# Patient Record
Sex: FEMALE | URBAN - METROPOLITAN AREA
[De-identification: names, ages, dates, MRNs, and addresses within clinical notes are randomized per-mention and may not be internally consistent; named-entity substitution may affect disease eponyms.]

---

## 2020-12-14 ENCOUNTER — OFFICE VISIT (OUTPATIENT)
Dept: URBAN - METROPOLITAN AREA CLINIC 41 | Facility: CLINIC | Age: 65
End: 2020-12-14
Payer: MEDICARE

## 2020-12-14 PROCEDURE — 92014 COMPRE OPH EXAM EST PT 1/>: CPT | Performed by: OPHTHALMOLOGY

## 2020-12-14 PROCEDURE — 92134 CPTRZ OPH DX IMG PST SGM RTA: CPT | Performed by: OPHTHALMOLOGY

## 2020-12-14 ASSESSMENT — INTRAOCULAR PRESSURE
OS: 12
OD: 14

## 2020-12-14 NOTE — IMPRESSION/PLAN
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 Plan: Has some tearing in setting or RLL ectropion. May consider plastics eval.  Stable from retina perspective. Will refer.

## 2020-12-14 NOTE — IMPRESSION/PLAN
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
OCT OU = no SRF/IRF ,  ERM with IRF  PMHx: Has myasthenia gravis. No longer takes Cellcept 1000mg PO BID. Labs: RPR negative, FIOR negative, ANCAs negative, Lyme negative, HSV/VZV serology +, quant indeterminant Plan: CMV retinitis OS. (Also had some retinitis OD) Last rituxan end of January 2019. No longer on cellcept. Now on soliris. No longer taking valganciclovir. Stable ERM/CME. Mild cell today. Was worse on daily durezol, stable on BID. Rec increase to TID x 2 weeks, then taper back to BID. May consider PPV/MP in future with symptomatic change. Call with changes/symptoms 3m OCT OU re-eval STS OS

## 2021-02-08 ENCOUNTER — OFFICE VISIT (OUTPATIENT)
Dept: URBAN - METROPOLITAN AREA CLINIC 41 | Facility: CLINIC | Age: 66
End: 2021-02-08
Payer: COMMERCIAL

## 2021-02-08 PROCEDURE — 92134 CPTRZ OPH DX IMG PST SGM RTA: CPT | Performed by: OPHTHALMOLOGY

## 2021-02-08 PROCEDURE — 99213 OFFICE O/P EST LOW 20 MIN: CPT | Performed by: OPHTHALMOLOGY

## 2021-02-08 ASSESSMENT — INTRAOCULAR PRESSURE
OD: 15
OS: 17

## 2021-02-08 NOTE — IMPRESSION/PLAN
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
OCT OU = no SRF/IRF  +3,  ERM with IRF  -12 PMHx: Has myasthenia gravis. No longer takes Cellcept 1000mg PO BID. Labs: RPR negative, FIOR negative, ANCAs negative, Lyme negative, HSV/VZV serology +, quant indeterminant Plan: CMV retinitis OS. (Also had some retinitis OD) Last rituxan end of January 2019. No longer on cellcept. Now on soliris. No longer taking valganciclovir. Stable ERM/CME. May consider PPV/MP in future with symptomatic change. Was worse on daily durezol, stable on BID. Had been on TID for mild flare, then back to BID. Now on TID again for corneal edema per Dr. Kitty Farrell. Rec CSM. Call with changes/symptoms 3m OCT OU re-eval STS OS

## 2021-02-08 NOTE — IMPRESSION/PLAN
Impression: Corneal edema: H18.20. Plan: Stable from retina perspective for cornea surgery. D/w patient that retinal/macular pathology may limit final visual outcome. Patient voices understanding.

## 2021-04-02 ENCOUNTER — OFFICE VISIT (OUTPATIENT)
Dept: URBAN - METROPOLITAN AREA CLINIC 41 | Facility: CLINIC | Age: 66
End: 2021-04-02
Payer: MEDICARE

## 2021-04-02 PROCEDURE — 92134 CPTRZ OPH DX IMG PST SGM RTA: CPT | Performed by: OPHTHALMOLOGY

## 2021-04-02 PROCEDURE — 92014 COMPRE OPH EXAM EST PT 1/>: CPT | Performed by: OPHTHALMOLOGY

## 2021-04-02 ASSESSMENT — INTRAOCULAR PRESSURE
OD: 13
OS: 12

## 2021-04-02 NOTE — IMPRESSION/PLAN
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
- h/o gold weight Plan: Has some tearing in setting or RLL ectropion.   Improved after plastics eval

## 2021-05-27 ENCOUNTER — OFFICE VISIT (OUTPATIENT)
Dept: URBAN - METROPOLITAN AREA CLINIC 41 | Facility: CLINIC | Age: 66
End: 2021-05-27
Payer: MEDICARE

## 2021-05-27 DIAGNOSIS — H30.92: Primary | ICD-10-CM

## 2021-05-27 PROCEDURE — 99213 OFFICE O/P EST LOW 20 MIN: CPT | Performed by: OPHTHALMOLOGY

## 2021-05-27 PROCEDURE — 92134 CPTRZ OPH DX IMG PST SGM RTA: CPT | Performed by: OPHTHALMOLOGY

## 2021-05-27 ASSESSMENT — INTRAOCULAR PRESSURE
OD: 15
OS: 12

## 2021-05-27 NOTE — IMPRESSION/PLAN
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
OCT OU = no SRF/IRF OD, ERM with IRF OS  / 396 PMHx: Has myasthenia gravis. No longer takes Cellcept 1000mg PO BID. Labs: RPR negative, FIOR negative, ANCAs negative, Lyme negative, HSV/VZV serology +, quant indeterminant Plan: CMV retinitis OS. (Also had some retinitis OD) Last rituxan end of January 2019. No longer on cellcept. Now on soliris. No longer taking valganciclovir. Stable ERM/CME. May consider PPV/MP in future with symptomatic change. Was worse on daily durezol, stable on BID. Had been on TID for mild flare, then back to BID. Now on TID again for corneal edema per Dr. Luisito Mathis. Rec CSM. Call with changes/symptoms 3 m OCT OU re-eval STS OS ([facility])

## 2021-09-20 ENCOUNTER — OFFICE VISIT (OUTPATIENT)
Dept: URBAN - METROPOLITAN AREA CLINIC 7 | Facility: CLINIC | Age: 66
End: 2021-09-20
Payer: MEDICARE

## 2021-09-20 DIAGNOSIS — Z96.1 PRESENCE OF INTRAOCULAR LENS: ICD-10-CM

## 2021-09-20 DIAGNOSIS — H35.372 PUCKERING OF MACULA, LEFT EYE: ICD-10-CM

## 2021-09-20 PROCEDURE — 99214 OFFICE O/P EST MOD 30 MIN: CPT | Performed by: OPHTHALMOLOGY

## 2021-09-20 PROCEDURE — 92134 CPTRZ OPH DX IMG PST SGM RTA: CPT | Performed by: OPHTHALMOLOGY

## 2021-09-20 ASSESSMENT — INTRAOCULAR PRESSURE
OD: 7
OS: 6

## 2021-09-20 NOTE — IMPRESSION/PLAN
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
OCT OU = no SRF/IRF OD, ERM with IRF OS  / 391 PMHx: Has myasthenia gravis. No longer takes Cellcept 1000mg PO BID. Labs: RPR negative, FIOR negative, ANCAs negative, Lyme negative, HSV/VZV serology +, quant indeterminant Plan: CMV retinitis OS. (Also had some retinitis OD) Last rituxan end of January 2019. No longer on cellcept. Now on soliris. No longer taking valganciclovir. Stable ERM/CME. May consider PPV/MP in future with symptomatic change. Was worse on daily durezol, stable on BID/TID. Had been on TID for mild flare, then back to BID. Was on TID again for corneal edema per Dr. Juarez Done - ok from retina perspective to resume BID. Stable for MRx of cornea surgery from retina perspective Call with changes/symptoms 3 m OCT OU re-eval STS OS

## 2021-09-20 NOTE — IMPRESSION/PLAN
Impression: Puckering of macula, left eye: H35.372. Plan: The patient has an ERM in the setting of corneal edema and retinitis, thus we will observe for now. Patient knows to call with any decrease in vision and increase in metamorphopsia.

## 2021-10-25 ENCOUNTER — OFFICE VISIT (OUTPATIENT)
Dept: URBAN - METROPOLITAN AREA CLINIC 13 | Facility: CLINIC | Age: 66
End: 2021-10-25
Payer: MEDICARE

## 2021-10-25 DIAGNOSIS — H18.20 CORNEAL EDEMA: ICD-10-CM

## 2021-10-25 PROCEDURE — 92134 CPTRZ OPH DX IMG PST SGM RTA: CPT | Performed by: OPHTHALMOLOGY

## 2021-10-25 PROCEDURE — 99213 OFFICE O/P EST LOW 20 MIN: CPT | Performed by: OPHTHALMOLOGY

## 2021-10-25 ASSESSMENT — INTRAOCULAR PRESSURE
OS: 7
OD: 16

## 2021-10-25 NOTE — IMPRESSION/PLAN
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
OCT OU = no SRF/IRF OD, ERM with IRF OS  / 1179 PMHx: Has myasthenia gravis. No longer takes Cellcept 1000mg PO BID. Labs: RPR negative, FIOR negative, ANCAs negative, Lyme negative, HSV/VZV serology +, quant indeterminant Plan: CMV retinitis OS. (Also had some retinitis OD) Last rituxan end of January 2019. No longer on cellcept. Now on soliris. No longer taking valganciclovir. Now with recurrent PVR/RRD and limited [facility] prognosis. D/w patient in detail. Please see PVR diagnosis.

## 2021-10-25 NOTE — IMPRESSION/PLAN
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 We discussed the natural history and the risks and benefit of repairing the PVR detachment, which consists of vitrectomy surgery vs observation. With repair, hopefully we can reduce the risk of further visual loss. Risks of surgery include but are not limited to loss of eye, blindness, infection, scar tissue formation, recurrent retinal tears and detachment, glaucoma, change in refractive error, ptosis, need for further surgery, epiretinal membranes, CME and use of gas or silicone oil. The patient defers surgery for now, but will consider her options. Get Optos for doc 1m Optos OU

## 2021-11-12 ENCOUNTER — OFFICE VISIT (OUTPATIENT)
Dept: URBAN - METROPOLITAN AREA CLINIC 41 | Facility: CLINIC | Age: 66
End: 2021-11-12
Payer: MEDICARE

## 2021-11-12 DIAGNOSIS — H33.42 TRACTION DETACHMENT OF RETINA, LEFT EYE: Primary | ICD-10-CM

## 2021-11-12 DIAGNOSIS — H02.109 ECTROPION OF EYE: ICD-10-CM

## 2021-11-12 PROCEDURE — 99214 OFFICE O/P EST MOD 30 MIN: CPT | Performed by: OPHTHALMOLOGY

## 2021-11-12 ASSESSMENT — INTRAOCULAR PRESSURE
OD: 11
OS: 4

## 2021-11-12 NOTE — IMPRESSION/PLAN
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 We discussed the natural history and the risks and benefit of repairing the PVR detachment, which consists of vitrectomy surgery vs observation. With repair, hopefully we can reduce the risk of further visual loss. Risks of surgery include but are not limited to loss of eye, blindness, infection, scar tissue formation, recurrent retinal tears and detachment, glaucoma, change in refractive error, ptosis, need for further surgery, epiretinal membranes, CME and use of gas or silicone oil. The patient now elects to proceed with surgery 25gPPV/MP/EL/ poss iris hooks/ poss SO vs Gas OS ([facility]) x RD with PVR/ history of ARN.

## 2021-12-01 ENCOUNTER — Encounter (OUTPATIENT)
Dept: URBAN - METROPOLITAN AREA EXTERNAL CLINIC 14 | Facility: EXTERNAL CLINIC | Age: 66
End: 2021-12-01
Payer: MEDICARE

## 2021-12-01 PROCEDURE — 67108 REPAIR DETACHED RETINA: CPT | Performed by: OPHTHALMOLOGY

## 2021-12-02 ENCOUNTER — POST-OPERATIVE VISIT (OUTPATIENT)
Dept: URBAN - METROPOLITAN AREA CLINIC 41 | Facility: CLINIC | Age: 66
End: 2021-12-02
Payer: MEDICARE

## 2021-12-02 PROCEDURE — 99024 POSTOP FOLLOW-UP VISIT: CPT | Performed by: OPHTHALMOLOGY

## 2021-12-02 NOTE — IMPRESSION/PLAN
Impression: S/P PPV/MP/EL/ poss iris hooks/ poss SO vs Gas OS - 1 Day. 12/1/2021 ([facility]) Plan: Retina flat. IOP is acceptable (4). No s/s of infection. Drops: PF QID / Ofloxacin QID Return in 1 week

## 2021-12-09 ENCOUNTER — POST-OPERATIVE VISIT (OUTPATIENT)
Dept: URBAN - METROPOLITAN AREA CLINIC 41 | Facility: CLINIC | Age: 66
End: 2021-12-09
Payer: MEDICARE

## 2021-12-09 PROCEDURE — 99024 POSTOP FOLLOW-UP VISIT: CPT | Performed by: OPHTHALMOLOGY

## 2021-12-09 ASSESSMENT — INTRAOCULAR PRESSURE
OS: 9
OD: 14

## 2021-12-09 NOTE — IMPRESSION/PLAN
Impression: S/P 25gPPV/MP/EL/ poss iris hooks/ poss SO vs Gas  ([facility]) x RD with PVR OS - 8 Days. Retinal detachment with single break, left eye  H33.012. Plan: No s/s of RD/infection VA/IOP acceptable Post-operative instructions and precautions Reviewed. Call ASAP with changes --Taper Prednisolone acetate 1% TID x 1 wk, BID x 1wk, QD x 1wk, then d/c
--Discontinue Ocuflox --Resume Durezol BID as before surgery
--hold off pre-op timolol for now 1 month POS/OCT

## 2021-12-09 NOTE — IMPRESSION/PLAN
Eating Heart-Healthy Food: Using the DASH Plan    Eating for your heart doesn’t have to be hard or boring. You just need to know how to make healthier choices. The DASH eating plan has been developed to help you do just that. DASH stands for Dietary Approaches to Stop Hypertension. It is a plan that has been proven to be healthier for your heart and to lower your risk for high blood pressure. It can also help lower your risk for cancer, heart disease, osteoporosis, and diabetes.  Choosing from each food group  Choose foods from each of the food groups below each day. Try to get the recommended number of servings for each food group. The serving numbers are based on a diet of 2,000 calories a day. Talk with your healthcare provider if you’re not sure about your calorie needs. Along with getting the correct servings, the DASH plan also advises less than 2,300 mg of salt (sodium) per day. Lowering sodium intake to 1,500 mg per day lowers blood pressure even more. (There's about 2,300 mg of sodium in 1 teaspoon of salt.)      Grains  Servings: 6 to 8 a day  A serving is:  · 1 slice bread  · 1 ounce dry cereal  · Half a cup cooked rice, pasta or cereal  Best choices: Whole grains and any grains high in fiber. Vegetables  Servings: 4 to 5 a day  A serving is:  · 1 cup raw leafy vegetable  · Half a cup cut-up raw or cooked vegetable  · Half a cup vegetable juice  Best choices: Fresh or frozen vegetables prepared without added salt or fat.   Fruits  Servings: 4 to 5 a day  A serving is:  · 1 medium fruit  · One-quarter cup dried fruit  · Half a cup fresh, frozen, or canned fruit  · Half a cup of 100% fruit juices  Best choices: A variety of fresh fruits of different colors. Whole fruits are a better choice than fruit juices. 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
OCT OU = no SRF/IRF  +3,  ERM with IRF  -12 PMHx: Has myasthenia gravis. No longer takes Cellcept 1000mg PO BID. Labs: RPR negative, FIOR negative, ANCAs negative, Lyme negative, HSV/VZV serology +, quant indeterminant Plan: CMV retinitis OS. (Also had some retinitis OD) Last rituxan end of January 2019. No longer on cellcept. Now on soliris. No longer taking valganciclovir. Stable ERM/CME. May consider PPV/MP in future with symptomatic change. Was worse on daily durezol, stable on BID. Had been on TID for mild flare, then back to BID. Now on TID again for corneal edema per Dr. Oral Rudolph. Rec CSM. Call with changes/symptoms 1 m OCT OU re-eval STS OS ([facility]) and low-fat cheeses.         Lean meats, poultry, fish  Servings: 6 or fewer a day  A serving is:  · 1 ounce cooked meats, poultry, or fish  · 1 egg  Best choices: Lean poultry and fish. Trim away visible fat. Broil, grill, roast, or boil instead of frying. Remove skin from poultry before eating. Limit how much red meat you eat.  Nuts, seeds, beans  Servings: 4 to 5 a week  A serving is:  · One-third cup nuts (one and a half ounces)  · 2 tablespoons nut butter or seeds  · Half a cup cooked dry beans or legumes  Best choices: Dry roasted nuts with no salt added, lentils, kidney beans, garbanzo beans, and whole zhu beans.   Fats and oils  Servings: 2 to 3 a day  A serving is:  · 1 teaspoon vegetable oil  · 1 teaspoon soft margarine  · 1 tablespoon mayonnaise  · 2 tablespoons salad dressing  Best choices: Nut and vegetable oils (nontropical vegetable oils), such as olive and canola oil. Sweets  Servings: 5 a week or fewer  A serving is:  · 1 tablespoon sugar, maple syrup, or honey  · 1 tablespoon jam or jelly  · 1 half-ounce jelly beans (about 15)  · 1 cup lemonade  Best choices: Dried fruit can be a satisfying sweet. Choose low-fat sweets. And watch your serving sizes!      For more on the DASH eating plan, visit:  www.nhlbi.nih.gov/health/health-topics/topics/dash   Kyree last reviewed this educational content on 7/1/2019  © 1537-4759 [employer]. [address] [place] [address], [place] [address]. All rights reserved. This information is not intended as a substitute for professional medical care. Always follow your healthcare professional's instructions.

## 2021-12-11 ENCOUNTER — POST-OPERATIVE VISIT (OUTPATIENT)
Dept: URBAN - METROPOLITAN AREA CLINIC 7 | Facility: CLINIC | Age: 66
End: 2021-12-11
Payer: MEDICARE

## 2021-12-11 PROCEDURE — 99024 POSTOP FOLLOW-UP VISIT: CPT | Performed by: OPHTHALMOLOGY

## 2021-12-11 RX ORDER — ERYTHROMYCIN 5 MG/G
OINTMENT OPHTHALMIC
Qty: 3.5 | Refills: 1 | Status: INACTIVE
Start: 2021-12-11 | End: 2021-12-12

## 2021-12-11 ASSESSMENT — INTRAOCULAR PRESSURE
OD: 13
OS: 9

## 2021-12-11 NOTE — IMPRESSION/PLAN
Impression: S/P 25gPPV/EL/SO x RD OS - 10 Days. Retinal detachment with single break, left eye  H33.012. Plan: No s/s of RD/infection VA/IOP acceptable Here as there is some heme coming from eye - likely eyelid irritation over tube shunt device
no active hemorrhaging seen, even with everted eye lids Post-operative instructions and precautions Reviewed. 
Call ASAP with changes
continue taper of drops as before
add erythromycin ointment QID

1 month POS/OCT as scheduled

## 2022-01-14 ENCOUNTER — POST-OPERATIVE VISIT (OUTPATIENT)
Dept: URBAN - METROPOLITAN AREA CLINIC 41 | Facility: CLINIC | Age: 67
End: 2022-01-14
Payer: MEDICARE

## 2022-01-14 PROCEDURE — 99024 POSTOP FOLLOW-UP VISIT: CPT | Performed by: OPHTHALMOLOGY

## 2022-01-14 ASSESSMENT — INTRAOCULAR PRESSURE
OS: 5
OD: 11

## 2022-01-14 NOTE — IMPRESSION/PLAN
Impression: S/P 25gPPV/MP/EL/ poss iris hooks/ poss SO vs Gas  ([facility]) x RD with PVR OS - 44 Days. Retinal detachment with single break, left eye  H33.012. Plan: No s/s of RD/infection VA/IOP acceptable Post-operative instructions and precautions Reviewed. Call ASAP with changes Cont Durezol BID

3m OCT OU

## 2022-04-04 ENCOUNTER — OFFICE VISIT (OUTPATIENT)
Dept: URBAN - METROPOLITAN AREA CLINIC 41 | Facility: CLINIC | Age: 67
End: 2022-04-04
Payer: MEDICARE

## 2022-04-04 DIAGNOSIS — H33.012 RETINAL DETACHMENT WITH SINGLE BREAK, LEFT EYE: Primary | ICD-10-CM

## 2022-04-04 PROCEDURE — 99214 OFFICE O/P EST MOD 30 MIN: CPT | Performed by: OPHTHALMOLOGY

## 2022-04-04 PROCEDURE — 92134 CPTRZ OPH DX IMG PST SGM RTA: CPT | Performed by: OPHTHALMOLOGY

## 2022-04-04 ASSESSMENT — INTRAOCULAR PRESSURE
OS: 13
OD: 18

## 2022-04-04 NOTE — IMPRESSION/PLAN
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 No longer takes Cellcept 1000mg PO BID. Labs: RPR negative, FIOR negative, ANCAs negative, Lyme negative, HSV/VZV serology +, quant indeterminant Plan: CMV retinitis OS. (Also had some retinitis OD) Last rituxan end of January 2019. No longer on cellcept. Now on soliris. No longer taking valganciclovir. Now with recurrent PVR/RRD and limited VA prognosis. D/w patient in detail. Please see PVR diagnosis.

## 2022-04-04 NOTE — IMPRESSION/PLAN
Impression: Retinal detachment with single break, left eye  H33.012. S/P 25gPPV/MP/EL/ poss iris hooks/ poss SO vs Gas (12/01/2021) OCT OU - no IRF/SRF OD, IRF OS  / 477  Plan: No s/s of RD/infection VA/IOP acceptable Post-operative instructions and precautions Reviewed. Call ASAP with changes Some edema - rec [id number] Durezol to BID

3m OCT OU

## 2022-05-02 ENCOUNTER — OFFICE VISIT (OUTPATIENT)
Dept: URBAN - METROPOLITAN AREA CLINIC 41 | Facility: CLINIC | Age: 67
End: 2022-05-02
Payer: MEDICARE

## 2022-05-02 DIAGNOSIS — H18.20 CORNEAL EDEMA: ICD-10-CM

## 2022-05-02 DIAGNOSIS — Z96.1 PRESENCE OF INTRAOCULAR LENS: ICD-10-CM

## 2022-05-02 DIAGNOSIS — H30.92: ICD-10-CM

## 2022-05-02 DIAGNOSIS — H02.109 ECTROPION OF EYE: ICD-10-CM

## 2022-05-02 DIAGNOSIS — H33.012 RETINAL DETACHMENT WITH SINGLE BREAK, LEFT EYE: Primary | ICD-10-CM

## 2022-05-02 PROCEDURE — 99214 OFFICE O/P EST MOD 30 MIN: CPT | Performed by: OPHTHALMOLOGY

## 2022-05-02 PROCEDURE — 92134 CPTRZ OPH DX IMG PST SGM RTA: CPT | Performed by: OPHTHALMOLOGY

## 2022-05-02 ASSESSMENT — INTRAOCULAR PRESSURE
OS: 12
OD: 17

## 2022-05-02 NOTE — IMPRESSION/PLAN
Impression: Retinal detachment with single break, left eye  H33.012. S/P 25gPPV/MP/EL/ poss iris hooks/ poss SO vs Gas (12/01/2021) OCT OU - no IRF/SRF OD, IRF OS  / 477 Plan: Patient here due to eye pain and new flashes. Corneal edema is worse and there is some mild anterior segment inflammation. VA/IOP acceptable Post-operative instructions and precautions Reviewed. Call ASAP with changes Some edema - rec increase Durezol to QID; restart Felipa; cont valtrex. 

1 mo OCT OU ([facility])

## 2022-06-27 ENCOUNTER — OFFICE VISIT (OUTPATIENT)
Dept: URBAN - METROPOLITAN AREA CLINIC 41 | Facility: CLINIC | Age: 67
End: 2022-06-27
Payer: MEDICARE

## 2022-06-27 DIAGNOSIS — H18.20 CORNEAL EDEMA: ICD-10-CM

## 2022-06-27 DIAGNOSIS — H02.109 ECTROPION OF EYE: ICD-10-CM

## 2022-06-27 DIAGNOSIS — H30.92: ICD-10-CM

## 2022-06-27 DIAGNOSIS — H33.012 RETINAL DETACHMENT WITH SINGLE BREAK, LEFT EYE: Primary | ICD-10-CM

## 2022-06-27 DIAGNOSIS — Z96.1 PRESENCE OF INTRAOCULAR LENS: ICD-10-CM

## 2022-06-27 PROCEDURE — 99213 OFFICE O/P EST LOW 20 MIN: CPT | Performed by: OPHTHALMOLOGY

## 2022-06-27 PROCEDURE — 92134 CPTRZ OPH DX IMG PST SGM RTA: CPT | Performed by: OPHTHALMOLOGY

## 2022-06-27 ASSESSMENT — INTRAOCULAR PRESSURE
OD: 10
OS: 7

## 2022-06-27 NOTE — IMPRESSION/PLAN
Impression: Retinal detachment with single break, left eye  H33.012. S/P 25gPPV/MP/EL/ poss iris hooks/ poss SO vs Gas (12/01/2021) OCT OU - no IRF/SRF OD , no view OS Plan: Still has corneal edema Would rec CSM with Cecilia SOTO and [employer] Would rec earlier eval with Dr. Maggy Morales Uncertain benefit to further surgery, but may d/w Dr. Maggy Morales 3m OCT OU

## 2022-06-27 NOTE — IMPRESSION/PLAN
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 No longer takes Cellcept 1000mg PO BID. Labs: RPR negative, FIOR negative, ANCAs negative, Lyme negative, HSV/VZV serology +, quant indeterminant Plan: CMV retinitis OS. (Also had some retinitis OD) Last rituxan end of January 2019. No longer on cellcept. Now on soliris. On Valtrex; no longer taking valganciclovir. 

Stable

## 2022-09-19 ENCOUNTER — OFFICE VISIT (OUTPATIENT)
Dept: URBAN - METROPOLITAN AREA CLINIC 7 | Facility: CLINIC | Age: 67
End: 2022-09-19
Payer: MEDICARE

## 2022-09-19 DIAGNOSIS — H16.012 CENTRAL CORNEAL ULCER, LEFT EYE: ICD-10-CM

## 2022-09-19 DIAGNOSIS — H02.109 ECTROPION OF EYE: ICD-10-CM

## 2022-09-19 DIAGNOSIS — H30.92: ICD-10-CM

## 2022-09-19 DIAGNOSIS — H33.012 RETINAL DETACHMENT WITH SINGLE BREAK, LEFT EYE: Primary | ICD-10-CM

## 2022-09-19 DIAGNOSIS — Z96.1 PRESENCE OF INTRAOCULAR LENS: ICD-10-CM

## 2022-09-19 DIAGNOSIS — H18.20 CORNEAL EDEMA: ICD-10-CM

## 2022-09-19 PROCEDURE — 92134 CPTRZ OPH DX IMG PST SGM RTA: CPT | Performed by: OPHTHALMOLOGY

## 2022-09-19 PROCEDURE — 99213 OFFICE O/P EST LOW 20 MIN: CPT | Performed by: OPHTHALMOLOGY

## 2022-09-19 ASSESSMENT — INTRAOCULAR PRESSURE
OD: 16
OS: 7

## 2022-09-19 NOTE — IMPRESSION/PLAN
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 Plan: Managed by Kip Horn and Gio Meals
CSM with them

## 2022-09-19 NOTE — IMPRESSION/PLAN
Impression: Retinal detachment with single break, left eye  H33.012. S/P 25gPPV/MP/EL/ poss iris hooks/ poss SO vs Gas (12/01/2021) OCT OU - no IRF/SRF OD , no view OS Plan: Give history, would rec keeping SO to maintain eye and reduce risk of phthisis D/w patient in detail 3m OCT OU

## 2022-12-12 ENCOUNTER — OFFICE VISIT (OUTPATIENT)
Dept: URBAN - METROPOLITAN AREA CLINIC 7 | Facility: CLINIC | Age: 67
End: 2022-12-12
Payer: MEDICARE

## 2022-12-12 DIAGNOSIS — H30.92: ICD-10-CM

## 2022-12-12 DIAGNOSIS — H16.012 CENTRAL CORNEAL ULCER, LEFT EYE: ICD-10-CM

## 2022-12-12 DIAGNOSIS — Z96.1 PRESENCE OF INTRAOCULAR LENS: ICD-10-CM

## 2022-12-12 DIAGNOSIS — H33.012 RETINAL DETACHMENT WITH SINGLE BREAK, LEFT EYE: Primary | ICD-10-CM

## 2022-12-12 PROCEDURE — 92134 CPTRZ OPH DX IMG PST SGM RTA: CPT | Performed by: OPHTHALMOLOGY

## 2022-12-12 PROCEDURE — 99213 OFFICE O/P EST LOW 20 MIN: CPT | Performed by: OPHTHALMOLOGY

## 2022-12-12 ASSESSMENT — INTRAOCULAR PRESSURE
OS: 13
OD: 16

## 2022-12-12 NOTE — IMPRESSION/PLAN
Impression: Retinal detachment with single break, left eye  H33.012. S/P 25gPPV/MP/EL/ poss iris hooks/ poss SO vs Gas (12/01/2021) OCT OU - no IRF/SRF OD , no view OS Plan: Give history, would rec keeping SO to maintain eye and reduce risk of phthisis D/w patient in detail 6m OCT OU

## 2022-12-12 NOTE — IMPRESSION/PLAN
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 Plan: Managed by Drs. Ronita Cogan and Yany Chaudhry with plans for PK
CSM with them No retinal contraindication for surgery. D/w patient that retinal/macular pathology may limit final visual outcome. Patient voices understanding.

## 2023-06-12 ENCOUNTER — OFFICE VISIT (OUTPATIENT)
Dept: URBAN - METROPOLITAN AREA CLINIC 7 | Facility: CLINIC | Age: 68
End: 2023-06-12
Payer: MEDICARE

## 2023-06-12 DIAGNOSIS — H30.92: ICD-10-CM

## 2023-06-12 DIAGNOSIS — H33.012 RETINAL DETACHMENT WITH SINGLE BREAK, LEFT EYE: Primary | ICD-10-CM

## 2023-06-12 DIAGNOSIS — H16.012 CENTRAL CORNEAL ULCER, LEFT EYE: ICD-10-CM

## 2023-06-12 DIAGNOSIS — H35.371 PUCKERING OF MACULA, RIGHT EYE: ICD-10-CM

## 2023-06-12 DIAGNOSIS — Z96.1 PRESENCE OF INTRAOCULAR LENS: ICD-10-CM

## 2023-06-12 PROCEDURE — 99214 OFFICE O/P EST MOD 30 MIN: CPT | Performed by: OPHTHALMOLOGY

## 2023-06-12 PROCEDURE — 92134 CPTRZ OPH DX IMG PST SGM RTA: CPT | Performed by: OPHTHALMOLOGY

## 2023-06-12 ASSESSMENT — INTRAOCULAR PRESSURE
OS: 18
OD: 20

## 2023-06-12 NOTE — IMPRESSION/PLAN
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 No longer takes Cellcept 1000mg PO BID. Labs: RPR negative, FIOR negative, ANCAs negative, Lyme negative, HSV/VZV serology +, quant indeterminant Plan: CMV retinitis OS. (Also had some retinitis OD) Last rituxan end of January 2019. No longer on cellcept or soliris. Now ultomiris for myasthenia gravis. On Valtrex; no longer taking valganciclovir. 

Stable

## 2023-06-12 NOTE — IMPRESSION/PLAN
Impression: Puckering of macula, right eye: H35.371. Plan: Stable on review of OCT from 3 years ago. Rec obs.

## 2023-12-11 ENCOUNTER — OFFICE VISIT (OUTPATIENT)
Dept: URBAN - METROPOLITAN AREA CLINIC 7 | Facility: CLINIC | Age: 68
End: 2023-12-11
Payer: MEDICARE

## 2023-12-11 DIAGNOSIS — Z96.1 PRESENCE OF INTRAOCULAR LENS: ICD-10-CM

## 2023-12-11 DIAGNOSIS — H16.012 CENTRAL CORNEAL ULCER, LEFT EYE: ICD-10-CM

## 2023-12-11 DIAGNOSIS — H33.012 RETINAL DETACHMENT WITH SINGLE BREAK, LEFT EYE: ICD-10-CM

## 2023-12-11 DIAGNOSIS — H35.371 PUCKERING OF MACULA, RIGHT EYE: Primary | ICD-10-CM

## 2023-12-11 DIAGNOSIS — H30.92: ICD-10-CM

## 2023-12-11 PROCEDURE — 99212 OFFICE O/P EST SF 10 MIN: CPT | Performed by: OPHTHALMOLOGY

## 2023-12-11 PROCEDURE — 92134 CPTRZ OPH DX IMG PST SGM RTA: CPT | Performed by: OPHTHALMOLOGY

## 2023-12-11 ASSESSMENT — INTRAOCULAR PRESSURE
OS: 21
OD: 15

## 2024-04-03 ENCOUNTER — OFFICE VISIT (OUTPATIENT)
Dept: URBAN - METROPOLITAN AREA CLINIC 7 | Facility: CLINIC | Age: 69
End: 2024-04-03
Payer: MEDICARE

## 2024-04-03 DIAGNOSIS — Z94.7 CORNEAL TRANSPLANT STATUS: ICD-10-CM

## 2024-04-03 DIAGNOSIS — H35.371 PUCKERING OF MACULA, RIGHT EYE: ICD-10-CM

## 2024-04-03 DIAGNOSIS — Z96.1 PRESENCE OF INTRAOCULAR LENS: ICD-10-CM

## 2024-04-03 DIAGNOSIS — H33.012 RETINAL DETACHMENT WITH SINGLE BREAK, LEFT EYE: Primary | ICD-10-CM

## 2024-04-03 DIAGNOSIS — H30.92: ICD-10-CM

## 2024-04-03 PROCEDURE — 92134 CPTRZ OPH DX IMG PST SGM RTA: CPT | Performed by: STUDENT IN AN ORGANIZED HEALTH CARE EDUCATION/TRAINING PROGRAM

## 2024-04-03 PROCEDURE — 99214 OFFICE O/P EST MOD 30 MIN: CPT | Performed by: STUDENT IN AN ORGANIZED HEALTH CARE EDUCATION/TRAINING PROGRAM

## 2024-04-03 PROCEDURE — 76512 OPH US DX B-SCAN: CPT | Performed by: STUDENT IN AN ORGANIZED HEALTH CARE EDUCATION/TRAINING PROGRAM

## 2024-04-03 ASSESSMENT — INTRAOCULAR PRESSURE
OD: 16
OS: 5

## 2024-10-04 ENCOUNTER — OFFICE VISIT (OUTPATIENT)
Dept: URBAN - METROPOLITAN AREA CLINIC 32 | Facility: CLINIC | Age: 69
End: 2024-10-04
Payer: MEDICARE

## 2024-10-04 DIAGNOSIS — Q11.1 ANOPHTHALMOS: Primary | ICD-10-CM

## 2024-10-04 PROCEDURE — V2628 FABRICATION & FITTING: HCPCS | Performed by: OPTOMETRIST

## 2024-10-04 PROCEDURE — 99203 OFFICE O/P NEW LOW 30 MIN: CPT | Performed by: OPTOMETRIST

## 2024-10-04 ASSESSMENT — INTRAOCULAR PRESSURE: OD: 15

## 2024-12-05 ENCOUNTER — OFFICE VISIT (OUTPATIENT)
Dept: URBAN - METROPOLITAN AREA CLINIC 32 | Facility: CLINIC | Age: 69
End: 2024-12-05
Payer: MEDICARE

## 2024-12-05 DIAGNOSIS — Q11.1 ANOPHTHALMOS: Primary | ICD-10-CM

## 2024-12-05 PROCEDURE — V2627 SCLERAL COVER SHELL: HCPCS | Performed by: OPTOMETRIST

## 2024-12-05 PROCEDURE — 99213 OFFICE O/P EST LOW 20 MIN: CPT | Performed by: OPTOMETRIST

## 2024-12-05 ASSESSMENT — INTRAOCULAR PRESSURE: OD: 13

## 2024-12-19 ENCOUNTER — OFFICE VISIT (OUTPATIENT)
Dept: URBAN - METROPOLITAN AREA CLINIC 32 | Facility: CLINIC | Age: 69
End: 2024-12-19
Payer: MEDICARE

## 2024-12-19 DIAGNOSIS — Q11.1 ANOPHTHALMOS: Primary | ICD-10-CM

## 2024-12-19 PROCEDURE — 99213 OFFICE O/P EST LOW 20 MIN: CPT | Performed by: OPTOMETRIST

## 2024-12-19 PROCEDURE — V2624 POLISHING ARTIFICAL EYE: HCPCS | Performed by: OPTOMETRIST

## 2024-12-19 ASSESSMENT — INTRAOCULAR PRESSURE: OD: 16

## 2025-01-23 ENCOUNTER — OFFICE VISIT (OUTPATIENT)
Dept: URBAN - METROPOLITAN AREA CLINIC 32 | Facility: CLINIC | Age: 70
End: 2025-01-23
Payer: MEDICARE

## 2025-01-23 DIAGNOSIS — Q11.1 ANOPHTHALMOS: Primary | ICD-10-CM

## 2025-01-23 PROCEDURE — 99204 OFFICE O/P NEW MOD 45 MIN: CPT | Performed by: OPHTHALMOLOGY

## 2025-02-05 ENCOUNTER — OFFICE VISIT (OUTPATIENT)
Dept: URBAN - METROPOLITAN AREA CLINIC 28 | Facility: CLINIC | Age: 70
End: 2025-02-05
Payer: MEDICARE

## 2025-02-05 DIAGNOSIS — H01.8 OTHER SPECIFIED INFLAMMATIONS OF EYELID: Primary | ICD-10-CM

## 2025-02-05 PROCEDURE — 99213 OFFICE O/P EST LOW 20 MIN: CPT | Performed by: OPTOMETRIST

## 2025-02-05 RX ORDER — AMOXICILLIN AND CLAVULANATE POTASSIUM 875; 125 MG/1; MG/1
TABLET, FILM COATED ORAL
Qty: 30 | Refills: 0 | Status: ACTIVE
Start: 2025-02-05

## 2025-04-09 ENCOUNTER — OFFICE VISIT (OUTPATIENT)
Dept: URBAN - METROPOLITAN AREA CLINIC 32 | Facility: CLINIC | Age: 70
End: 2025-04-09
Payer: MEDICARE

## 2025-04-09 DIAGNOSIS — Q11.1 ANOPHTHALMOS: Primary | ICD-10-CM

## 2025-04-09 PROCEDURE — V2624 POLISHING ARTIFICAL EYE: HCPCS | Performed by: OPTOMETRIST

## 2025-04-09 PROCEDURE — 99213 OFFICE O/P EST LOW 20 MIN: CPT | Performed by: OPTOMETRIST

## 2025-04-09 RX ORDER — DOXYCYCLINE HYCLATE 100 MG/1
100 MG TABLET, COATED ORAL
Qty: 60 | Refills: 1 | Status: ACTIVE
Start: 2025-04-09

## 2025-04-09 RX ORDER — PREDNISOLONE ACETATE 10 MG/ML
1 % SUSPENSION/ DROPS OPHTHALMIC
Qty: 5 | Refills: 5 | Status: ACTIVE
Start: 2025-04-09

## 2025-04-09 RX ORDER — DOXYCYCLINE HYCLATE 100 MG/1
100 MG TABLET, COATED ORAL
Qty: 60 | Refills: 1 | Status: INACTIVE
Start: 2025-04-09 | End: 2025-04-09

## 2025-04-09 ASSESSMENT — INTRAOCULAR PRESSURE: OD: 14

## 2025-06-04 ENCOUNTER — OFFICE VISIT (OUTPATIENT)
Dept: URBAN - METROPOLITAN AREA CLINIC 32 | Facility: CLINIC | Age: 70
End: 2025-06-04
Payer: MEDICARE

## 2025-06-04 DIAGNOSIS — Q11.1 ANOPHTHALMOS: Primary | ICD-10-CM

## 2025-06-04 PROCEDURE — 99213 OFFICE O/P EST LOW 20 MIN: CPT | Performed by: OPTOMETRIST

## 2025-06-04 ASSESSMENT — INTRAOCULAR PRESSURE: OD: 13
